# Patient Record
Sex: FEMALE | Race: OTHER | NOT HISPANIC OR LATINO | URBAN - METROPOLITAN AREA
[De-identification: names, ages, dates, MRNs, and addresses within clinical notes are randomized per-mention and may not be internally consistent; named-entity substitution may affect disease eponyms.]

---

## 2023-05-25 ENCOUNTER — OUTPATIENT (OUTPATIENT)
Dept: OUTPATIENT SERVICES | Facility: HOSPITAL | Age: 58
LOS: 1 days | Discharge: HOME | End: 2023-05-25

## 2023-05-25 VITALS
SYSTOLIC BLOOD PRESSURE: 130 MMHG | TEMPERATURE: 98 F | RESPIRATION RATE: 16 BRPM | DIASTOLIC BLOOD PRESSURE: 82 MMHG | HEART RATE: 72 BPM | OXYGEN SATURATION: 98 %

## 2023-05-25 VITALS
DIASTOLIC BLOOD PRESSURE: 86 MMHG | OXYGEN SATURATION: 100 % | HEIGHT: 63 IN | WEIGHT: 121.7 LBS | RESPIRATION RATE: 16 BRPM | SYSTOLIC BLOOD PRESSURE: 132 MMHG | TEMPERATURE: 98 F | HEART RATE: 87 BPM

## 2023-05-25 DIAGNOSIS — Z98.890 OTHER SPECIFIED POSTPROCEDURAL STATES: Chronic | ICD-10-CM

## 2023-05-25 DIAGNOSIS — Z98.49 CATARACT EXTRACTION STATUS, UNSPECIFIED EYE: Chronic | ICD-10-CM

## 2023-05-25 DEVICE — PERFLUORON 7ML KIT: Type: IMPLANTABLE DEVICE | Site: RIGHT | Status: FUNCTIONAL

## 2023-05-25 DEVICE — GAS IOL HEXAFLUORIDE CYL: Type: IMPLANTABLE DEVICE | Site: RIGHT | Status: FUNCTIONAL

## 2023-05-25 DEVICE — LASER PROBE 23G CONSTELLATION: Type: IMPLANTABLE DEVICE | Site: RIGHT | Status: FUNCTIONAL

## 2023-05-25 NOTE — ASU PATIENT PROFILE, ADULT - NSICDXPASTSURGICALHX_GEN_ALL_CORE_FT
PAST SURGICAL HISTORY:  H/O cataract extraction     S/P LASIK surgery     Status post hysteroscopic polypectomy

## 2023-05-25 NOTE — BRIEF OPERATIVE NOTE - NSICDXBRIEFPREOP_GEN_ALL_CORE_FT
PRE-OP DIAGNOSIS:  Rhegmatogenous retinal detachment of right eye 25-May-2023 17:09:53  Campbell Gamez

## 2023-05-25 NOTE — PRE-ANESTHESIA EVALUATION ADULT - NSANTHOSAYNRD_GEN_A_CORE
No. JOELLEN screening performed.  STOP BANG Legend: 0-2 = LOW Risk; 3-4 = INTERMEDIATE Risk; 5-8 = HIGH Risk

## 2023-05-25 NOTE — OPERATIVE REPORT - OPERATIVE RPOSRT DETAILS
VITRECTOMY for a Primary Retinal Detachment    PATIENT NAME: SHIRLEY MOHR    ATTENDING: Campbell Gamez Jr., MD    PREOPERATIVE DIAGNOSIS(ES):  Rhegmatogenous Retinal Detachment, Right eye     POSTOPERATIVE DIAGNOSIS(ES): Rhegmatogenous Retinal Detachment, Right eye     PROCEDURE: Pars plana vitrectomy, endolaser, air-fluid exchange, air-gas exchange, all of the Right eye     INDICATIONS:       The patient is a 57y year old Female with a history of a retinal detachment in the operative eye. After a detailed review of the risks, benefits and alternatives of the procedure, including but not limited to bleeding, infection, inflammation, retinal detachment, loss of vision, loss of eye, double vision, need for further surgery, and systemic risks of anesthesia and surgery, informed consent was obtained and the patient elected to proceed with the surgery.     PROCEDURE:       On the day of surgery, after clearance by medicine and anesthesia, the patient was brought to the operating room in stable condition. After verifying the correct surgical site, the patient was placed in the supine position. Intravenous sedation was provided by the anesthesia team.  After a brief time-out, a 4:1 mixture of 4% lidocaine and 0.75% Marcaine was injected in a retrobulbar fashion behind the operative eye. The patient was then prepped and draped in the usual sterile fashion.  Eyelashes were draped out of the operative field and a stainless steel eyelid speculum was placed in the operative eye.  A time-out was performed verifying correct patient, procedure, site, positioning and special equipment prior to starting the case.     A 23-gauge microcannula was placed in the inferotemporal quadrant in a beveled fashion 3.5 mm posterior to the limbus, as the patient was pseudophakic. The infusion cannula was cleared of air, inserted into the microcannula, confirmed to be in the correct position within the vitreous cavity by direct visualization through the dilated pupil with the light pipe and then turned on under direct visualization. Two additional microcannulas were placed superonasally and superotemporally in a similar fashion. The vitrectomy hand piece and light were then inserted into the eye and the anterior vitreous was cleared under direct visualization.     Then, under indirect wide field visualization, a core and peripheral vitrectomy was performed. The posterior hyaloid was  using active aspiration and Kenalog assistance.  The vitreous was excised 360 degrees to the posterior vitreous base and no additional retinal breaks were noted. Perfluorocarbon liquid was placed posteriorly to stabilize the retina.  The retina was noted to flatten nicely. 360 degree scleral depression was used to confirm there were no additional breaks other than the ones seen pre-operatively. Diathermy was used to hai the edges of the breaks.  Endophotocoagulation was then placed on the posterior edges of the retinal breaks identified.     A backflush was then used to perform an air-fluid exchange, taking care to drain the subretinal fluid from the most posterior break. Following the air-fluid exchange, additional laser was placed on the anterior aspects of the breaks with endophotocoagulation.       Following the air-fluid exchange, the superonasal microcannula was removed and the sclerotomy was sutured closed using 8-0 vicryl. ___ % ____ gas was drawn up by the attending physician and an air-gas exchange was performed. The remaining microcannulas were removed from the eye and the sclerotomies closed using the 8-0 vicryl in an interrupted fashion. All sclerotomies remained airtight and the eye remained at a physiologic pressure by palpation.     The eyelid speculum was removed from the eye. Betadine was cleaned from around the eye. A topical steroid/antibiotic cream was placed on the eye, followed by a patch and a clear plastic shield. The patient tolerated the procedure well and left the operating room in stable condition.      VITRECTOMY for a Primary Retinal Detachment    PATIENT NAME: SHIRLEY MOHR    ATTENDING: Campbell Gamez Jr., MD    PREOPERATIVE DIAGNOSIS(ES):  Rhegmatogenous Retinal Detachment, Right eye     POSTOPERATIVE DIAGNOSIS(ES): Rhegmatogenous Retinal Detachment, Right eye     PROCEDURE: Pars plana vitrectomy, endolaser, air-fluid exchange, air-gas exchange (25% SF6), all of the Right eye     INDICATIONS:       The patient is a 57y year old Female with a history of a retinal detachment in the operative eye. After a detailed review of the risks, benefits and alternatives of the procedure, including but not limited to bleeding, infection, inflammation, retinal detachment, loss of vision, loss of eye, double vision, need for further surgery, and systemic risks of anesthesia and surgery, informed consent was obtained and the patient elected to proceed with the surgery.     PROCEDURE:       On the day of surgery, after clearance by medicine and anesthesia, the patient was brought to the operating room in stable condition. After verifying the correct surgical site, the patient was placed in the supine position. Intravenous sedation was provided by the anesthesia team.  After a brief time-out, a 4:1 mixture of 4% lidocaine and 0.75% Marcaine was injected in a retrobulbar fashion behind the operative eye. The patient was then prepped and draped in the usual sterile fashion.  Eyelashes were draped out of the operative field and a stainless steel eyelid speculum was placed in the operative eye.  A time-out was performed verifying correct patient, procedure, site, positioning and special equipment prior to starting the case.     A 23-gauge microcannula was placed in the inferotemporal quadrant in a beveled fashion 3.5 mm posterior to the limbus, as the patient was pseudophakic. The infusion cannula was cleared of air, inserted into the microcannula, confirmed to be in the correct position within the vitreous cavity by direct visualization through the dilated pupil with the light pipe and then turned on under direct visualization. Two additional microcannulas were placed superonasally and superotemporally in a similar fashion. The vitrectomy hand piece and light were then inserted into the eye and the anterior vitreous was cleared under direct visualization.     Then, under indirect wide field visualization, a core and peripheral vitrectomy was performed. The posterior hyaloid was  using active aspiration and Kenalog assistance.  The vitreous was excised 360 degrees to the posterior vitreous base and no additional retinal breaks were noted. Perfluorocarbon liquid was placed posteriorly to stabilize the retina.  The retina was noted to flatten nicely. 360 degree scleral depression was used to confirm there were no additional breaks other than the ones seen pre-operatively. Diathermy was used to hai the edges of the breaks.  Endophotocoagulation was then placed on the posterior edges of the retinal breaks identified.     A backflush was then used to perform an air-fluid exchange, taking care to drain the subretinal fluid from the most posterior break. Following the air-fluid exchange, additional laser was placed on the anterior aspects of the breaks with endophotocoagulation.       Following the air-fluid exchange, the superonasal microcannula was removed and the sclerotomy was sutured closed using 8-0 vicryl. 25% SF6 gas was drawn up by the attending physician and an air-gas exchange was performed. The remaining microcannulas were removed from the eye and the sclerotomies closed using the 8-0 vicryl in an interrupted fashion. All sclerotomies remained airtight and the eye remained at a physiologic pressure by palpation.     The eyelid speculum was removed from the eye. Betadine was cleaned from around the eye. A topical steroid/antibiotic cream was placed on the eye, followed by a patch and a clear plastic shield. The patient tolerated the procedure well and left the operating room in stable condition.

## 2023-05-25 NOTE — BRIEF OPERATIVE NOTE - NSICDXBRIEFPOSTOP_GEN_ALL_CORE_FT
POST-OP DIAGNOSIS:  Rhegmatogenous retinal detachment of right eye 25-May-2023 17:10:05  Campbell Gamez

## (undated) DEVICE — SUT VICRYL 8-0 12" TG140-8 DA

## (undated) DEVICE — GLV 7.5 PROTEXIS (WHITE)

## (undated) DEVICE — TIP BACKFLUSH SOFT DISP 23GA

## (undated) DEVICE — PACK VITRECTOMY  LF

## (undated) DEVICE — SOL IRR BAL SALT 500ML

## (undated) DEVICE — DRAPE MICROSCOPE KNOB COVER SMALL (2 PCS)

## (undated) DEVICE — CANNULA MEDONE DUAL BORE SIDEFLO 23G

## (undated) DEVICE — CANNULA ALCON SOFT TIP 23G